# Patient Record
Sex: FEMALE | Race: WHITE
[De-identification: names, ages, dates, MRNs, and addresses within clinical notes are randomized per-mention and may not be internally consistent; named-entity substitution may affect disease eponyms.]

---

## 2018-09-06 ENCOUNTER — HOSPITAL ENCOUNTER (OUTPATIENT)
Dept: HOSPITAL 75 - PREOP | Age: 48
Discharge: HOME | End: 2018-09-06
Attending: PODIATRIST
Payer: COMMERCIAL

## 2018-09-06 VITALS — BODY MASS INDEX: 23.22 KG/M2 | WEIGHT: 136 LBS | HEIGHT: 64 IN

## 2018-09-06 VITALS — SYSTOLIC BLOOD PRESSURE: 126 MMHG | DIASTOLIC BLOOD PRESSURE: 79 MMHG

## 2018-09-06 DIAGNOSIS — Z01.818: Primary | ICD-10-CM

## 2018-09-06 PROCEDURE — 87081 CULTURE SCREEN ONLY: CPT

## 2018-09-24 ENCOUNTER — HOSPITAL ENCOUNTER (OUTPATIENT)
Dept: HOSPITAL 75 - SDC | Age: 48
Discharge: HOME | End: 2018-09-24
Attending: PODIATRIST
Payer: COMMERCIAL

## 2018-09-24 VITALS — BODY MASS INDEX: 23.22 KG/M2 | WEIGHT: 136 LBS | HEIGHT: 64 IN

## 2018-09-24 VITALS — DIASTOLIC BLOOD PRESSURE: 70 MMHG | SYSTOLIC BLOOD PRESSURE: 103 MMHG

## 2018-09-24 VITALS — DIASTOLIC BLOOD PRESSURE: 69 MMHG | SYSTOLIC BLOOD PRESSURE: 104 MMHG

## 2018-09-24 VITALS — SYSTOLIC BLOOD PRESSURE: 133 MMHG | DIASTOLIC BLOOD PRESSURE: 89 MMHG

## 2018-09-24 VITALS — DIASTOLIC BLOOD PRESSURE: 69 MMHG | SYSTOLIC BLOOD PRESSURE: 95 MMHG

## 2018-09-24 DIAGNOSIS — M24.874: ICD-10-CM

## 2018-09-24 DIAGNOSIS — M77.51: ICD-10-CM

## 2018-09-24 DIAGNOSIS — M20.11: Primary | ICD-10-CM

## 2018-09-24 DIAGNOSIS — M89.371: ICD-10-CM

## 2018-09-24 RX ADMIN — SODIUM CHLORIDE, SODIUM LACTATE, POTASSIUM CHLORIDE, AND CALCIUM CHLORIDE PRN MLS/HR: 600; 310; 30; 20 INJECTION, SOLUTION INTRAVENOUS at 09:30

## 2018-09-24 RX ADMIN — SODIUM CHLORIDE, SODIUM LACTATE, POTASSIUM CHLORIDE, AND CALCIUM CHLORIDE PRN MLS/HR: 600; 310; 30; 20 INJECTION, SOLUTION INTRAVENOUS at 07:40

## 2018-09-24 RX ADMIN — SODIUM CHLORIDE, SODIUM LACTATE, POTASSIUM CHLORIDE, AND CALCIUM CHLORIDE PRN MLS/HR: 600; 310; 30; 20 INJECTION, SOLUTION INTRAVENOUS at 06:35

## 2018-09-24 NOTE — PHYSICAL THERAPY ORTHO EVAL
PT Orthopedic Evaluation


Type of Surgery





Hallux Valgus, Capsulitis 2nd MTPJ, right





Prior Level of Function


Current Living Status:  Significant Other


Locomotion     (Upon Admit):  Independent


Pt brought in an iWalk; AD for ambulation; described as "hands free crutch"





Subjective


Subjective


Pt agreeable to PT.  Pt brought in an iWalk AD for ambulation.  She reports she 

did not try to use the device prior to today.  She reports the instructions 

were either in their car or at home.


Entry Into Home:  Stairs With Railing


Steps Into Home:  3





Motor Control


Motor Control:  Motor Control WNL





ROM


ROM:  WFL





Strength


Strength:  WFL


Gross U/LE strength is WNL





Transfer


Pt was able to transfer supine to sit without assist.  Initially she required 

CGA for sit to/from stand transfers but as treatment wore on, she was SBA with 

this transfer.  She performed multiple sit to stand attempts with crutches. 


She also stood and maintained standing with min assist as the iWalk AD was 

applied.





Gait


Gait Assistive Device:  Crutches (iWalk AD)


Right Lower Extremity:  Right


Weight Bearing Status RLE:  Non Weight Bearing


Left Lower Extremity:  Left


Weight Bearing Status LLE:  Full Weight Bearing





Gait training on level surfaces and up/down a curb step x 2 with crutches.


Pt was slightly unsteady initially and needed min assist, as treatment


went on, she was SB to CGA with gait on crutches.  Sig other present and


able to assist as needed.


Gait (FIM):  4 (at end of treatment, CG-SBA)


Distance (FIM):  2=234-80 ft


Summary/Comments


Pt unsteady initially, became more steady as she progressed.  


Up/down a curb step x 2 with CGA using crutches with skilled cues on sequencing 

and safety. 


Applied iWalk AD for ambulation and pt was able to walk a short distance with CG

-min assist with cues for safety.  


Instructed pt and sig other to continue practicing use of iWalk AD with assist 

and to find and read the instruction manual for safer application.





Treatment Rendered


Treatment:  Gait Train, Step Train


Pt able to use crutches for ambulation more safely than iWalk AD.  Also 

informed pt of knee scooter for mobility longer distances.  


Recommend use of crutches until she has had time to practice and use the iWalk 

device in a safer method.





Assessment/Goals


Goal Time Frame:  1 Visit


Recommend supervision with ambulation initially.  Pt had recently taken pain 

meds and was unsteady with upright mobility.  Feel that as this wears off and 

she uses the crutches more, she will be safe with crutch use.





Plan


Treatment Plan:  Discharge


Treatment Duration:  1 visit


PT/Family Agrees to Plan:  Yes





Time


Time In:  1310


Time Out:  1400


Total Billed Treatment Time:  50


Billed Treatment Time


visit


EVL 50











MARLENE SMITH PT Sep 24, 2018 14:30

## 2018-09-24 NOTE — DIAGNOSTIC IMAGING REPORT
INDICATION: Bunion deformity.



FINDINGS: Two fluoroscopic images of the right foot were

obtained. There are postsurgical changes of bunionectomy.

Hardware appears to be in satisfactory position.



IMPRESSION: Intraoperative fluoroscopy during bunionectomy as

described.



Dictated by: 



  Dictated on workstation # [id number]

## 2018-09-24 NOTE — OPERATIVE REPORT
DATE OF SERVICE:  09/24/2018



SURGEON:

Bandar Horn DPM.



PREOPERATIVE DIAGNOSES:

1.  Hallux abductovalgus metatarsal primus right foot.

2.  Hypertrophic second metatarsal, right foot.

3.  Capsulitis with plantar plate tear, right second metatarsophalangeal joint.



POSTOPERATIVE DIAGNOSES:

1.  Hallux abductovalgus metatarsal primus right foot.

2.  Hypertrophic second metatarsal, right foot.

3.  Capsulitis with plantar plate tear, right second metatarsophalangeal joint.



PROCEDURES:

1.  A lapidus bunionectomy with staple fixation and implant.

2.  Akin osteotomy with a 28-gauge monofilament wire fixation.

3.  Second metatarsal osteotomy with screw fixation.

4.  Plantar plate repair utilizing the Arthrex plantar plate repair system 
right 2nd MTPJ.



WOUND CLASS:

Clean.



ANESTHESIA:

General.



HEMOSTASIS:

Pneumatic thigh tourniquet at 250 mmHg.



INDICATIONS:

This 48-year-old female presents complaining of pain to the right forefoot and

bunion area.  Conservative therapy has met with unsatisfactory results and the

patient is agreeable to surgical intervention after risks and complications were

discussed at length.  No guarantees were extended to the patient and she is

willing to proceed.



DESCRIPTION OF PROCEDURE:

The patient was brought back to the operating table and placed in a secure

supine position.  A general anesthetic was then induced.  Appropriate timeout

was performed.  A pneumatic thigh tourniquet was placed on the right lower

extremity over several layers of padding.  The right foot was then prepped and

draped in the normal sterile manner. The right foot was then elevated and

allowed to exsanguinate after which the tourniquet was inflated to 250 mmHg.



Attention was then directed to the medial aspect of the right first metatarsal

cuneiform joint where a 4 cm longitudinal linear incision was created with a

great care to identify and retract all vital neurovascular structures as the

incision was deepened.  A capsulotomy was performed exposing the first

metatarsal cuneiform joint after which a power sagittal saw was utilized to

remove the articular cartilage.  Once the articular cartilage was removed, the

joint ends were then fenestrated utilizing a 0.062 smooth K-wire.  Next, a

calcium phosphate wedge from [employer] was introduced into the arthrodesis site. 

The medial portion of the wedge was 10 mm in width.  Intraoperative C-arm

confirmed appropriate reduction of the first intermetatarsal angle.  Two staples

were then utilized to secure the arthrodesis site; the first was medial and was

20 mm in length.  Excellent bony apposition and compression was appreciated. 

The next was a staple of 18 mm of width to the dorsal aspect of the first

metatarsal cuneiform joint.  The wound was flushed with copious amounts of

normal saline.  Next, [employer] bone matrix was then introduced to pack

in the remaining area of the first metatarsal cuneiform arthrodesis site. 

Closure was then performed in layers.  Deep closure was performed with 3-0

Vicryl, superficial with 4-0 Vicryl, skin closure with 4-0 Prolene in a

horizontal mattress type stitch.



Attention was then directed to the dorsal aspect of the first

metatarsophalangeal joint where a 4 cm longitudinal linear incision was created.

 The incisions were deepened in same plane with care to identify and retract all

vital neurovascular structures.  A longitudinal capsulotomy was performed

overlying the first metatarsophalangeal joint.  A medial eminence to the distal

diaphysis was identified and reduced with the power sagittal saw.  It was

evident that there is a large amount of medial first metatarsal head resected

previously from a bunionectomy performed by another physician.  The wound was

flushed with copious amounts of normal saline.  There was continued to be some

lateral deviation to the right hallux.  A lateral release was then performed. 

The conjoint tendon of the adductor hallucis was released as well as a left

lateral capsulorrhaphy.  It was then decided that an Akin type procedure should

be performed.  Subperiosteal dissection was carried out to the proximal phalanx

diaphysis after which a wedge of bone was resected with the base medial and the

lateral cortices held intact.  Next, a wire passing drill was utilized to create

a hole to the dorsal medial aspect of the osteotomy.  Once the osteotomy was

closed, a 28-gauge monofilament wire was then passed through the  hole

securing the osteotomy in a closed position.  Excellent bony apposition and

fixation was appreciated at this time.  The wound was flushed with copious

amounts of normal saline after which closure was performed in layers.  Deep

closure was performed with 3-0 Vicryl, superficial with 4-0 Vicryl, skin closure

with 4-0 Prolene in a horizontal mattress type stitch.



Attention was then directed to the dorsal aspect of the second

metatarsophalangeal joint where a 5 cm longitudinal linear incision was created.

 The incision was deepened in the same plane with great care to identify and

retract all vital neurovascular structures superiorly and the necessary blood

vessels were cauterized as encountered.  The extensor tendon was identified and

a lengthening performed in a Z slide fashion.  The extensor tendon was reflected

proximally and the extensor holliday released.  Next, a dorsal capsulorrhaphy was

performed to the second metatarsophalangeal joint and a release of the medial

and lateral collateral ligaments.  Next, a Weil type osteotomy was performed

with a power sagittal saw.  The capital fragment was translocated proximally and

fixated in its new position with a 0.062 K wire.  A second K wire was then

introduced to the base of the proximal phalanx.  A McGlamry elevator was

utilized to release any plantar adhesions to the second metatarsal head.  Next,

inspection of the plantar plate indicated a partial tear to the medial distal

portion of the plantar plate.  The plantar proximal aspect of the 2nd digit

proximal phalanx was roughened with a hand rasp.  Next, a [place] blade was

utilized to release the remaining attachment of the plantar plate to the

proximal phalanx.  Utilizing the Arthrex plantar plate repair kit, two

FiberWires were then passed through the distal remaining plantar plate.  Two

holes were created in a cross type fashion to the proximal phalanx from medial

dorsal to plantar lateral and from dorsal lateral to plantar medial.  The

FiberWire was then passed through these  holes at the base of the proximal

phalanx allowing the tension to be appropriate for the plantar plate.  An

excellent alignment of the right second toe was appreciated at this time.  The

second metatarsal head was placed in the appropriate alignment and a 2-0

snap-off screw of 12 mm length was then placed across the second metatarsal

osteotomy with excellent bony apposition and fixation.  The head was further

contoured smooth with a rongeur and power bur.  The wound was flushed with

copious amounts of normal saline.  Once again, the appropriate physiological

tension to the plantar plate was applied and the FiberWire was then hand tied. 

Closure was then performed in layers.  Deep closure was performed with 3-0

Vicryl, superficial with 4-0 Vicryl and skin closed with 4-0 Prolene in a

horizontal mattress type stitch.



Postoperative injection consisted of 10 mL of 0.5% Marcaine plain in a local

infusion to the surgical site.  Preoperative injection was the same with 10 mL

of 0.5% Marcaine in a Javed block and as well as a block to the second ray, right

foot.  Postoperative injection also included 10 mg of dexamethasone to the first

metatarsophalangeal joint area.  Postoperative dressing consisted of Betadine

soaked Adaptic, sterile 4 x 4s, sterile Kerlix all secured with a Coban wrap.



The patient tolerated the anesthesia and procedure well and was transported from

the operating room to the recovery area with vital signs stable and vascular

status intact to all digits of the right foot.  She is to be absolutely

nonweightbearing on the right foot and follow up in my office in 10 days' period

of time.  She was given a prescription for Keflex as well as Vicodin.





Job ID: [id number]

DocumentID: [id number]

Dictated Date:  09/24/2018 11:32:04

Transcription Date: 09/24/2018 13:35:41

Dictated By: DIAZ ANGELA

## 2018-09-24 NOTE — XMS REPORT
Continuity of Care Document

 Created on: 2018



TA FUNES

External Reference #: [id number]

: 1970

Sex: Female



Demographics







 [address]  [address] [place] [place]  [address]

 

 Home Phone  [phone] x

 

 Preferred Language  Unknown

 

 Marital Status  Unknown

 

 Mosque Affiliation  Unknown

 

 Race  Unknown

 

 Ethnic Group  Unknown





Author







 Author  [facility]

 

 Organization  [facility]

 

 Address  Unknown

 

 Phone  Unavailable



              



Allergies

      





 Active            Description            Code            Type            
Severity            Reaction            Onset            Reported/Identified   
         Relationship to Patient            Clinical Status        

 

 Yes            Sulfa (Sulfonamide Antibiotics)            [id number]          
  Drug Allergy            Unknown            N/A                         2014                                  



                  



Medications

      



There is no data.                  



Problems

      





 Date Dx Coded            Attending            Type            Code            
Diagnosis            Diagnosed By        

 

 2014            TONYA CARMONA            Ot            490        
    BRONCHITIS NOS                     

 

 2014            TONYA CARMONA            Ot            698.9      
      PRURITIC DISORDER NOS                     

 

 2014            TONYA CARMONA            Ot            786.2      
      COUGH                     

 

 2014            TONYA CARMONA            Ot            787.02     
       NAUSEA ALONE                     

 

 2014            TONYA CARMONA            Ot            E930.3     
       ADV EFF ERYTHROMYCIN                     

 

 2018            JOHN DPM, HOLLAND Q            Ot            Z01.818     
       ENCOUNTER FOR OTHER PREPROCEDURAL EXAMIN                     

 

 2018            JOHN DPM, HOLLAND Q            Ot            Z01.818     
       ENCOUNTER FOR OTHER PREPROCEDURAL EXAMIN                     



                              



Procedures

      



There is no data.                  



Results

      





 Test            Result            Range        









 Methicillin resistant Staphylococcus aureus (MRSA) screening culture -  10:31         









 MRSA SCREEN RESULT            MRSA NOT ISOLATED             NRG        



                



Encounters

      





 ACCT No.            Visit Date/Time            Discharge            Status    
        Pt. Type            Provider            Facility            Loc./Unit  
          Complaint        

 

 [id number]            09/10/2018 08:00:00            09/10/2018 23:59:59    
        [facility]            [facility]            JOHN DPM, HOLLAND Q            [facility]            SDC            [facility]        

 

 [id number]            2018 10:15:00            2018 10:35:00    
        DIS            Outpatient            JOHN DPM, HOLLAND Q            [facility]            PREOP            HALLUX VALGUS        

 

 [id number]            2014 11:21:00            2014 13:19:00    
        DIS            Emergency            TONYA CARMONA            [facility]            ER            COUGH/CONGESTION        

 

 [email or web address]            2014 11:21:54                         [facility]            
Document Registration

## 2018-09-24 NOTE — ANESTHESIA-GENERAL POST-OP
General


Patient Condition


Mental Status/LOC:  Same as Preop


Cardiovascular:  Satisfactory


Nausea/Vomiting:  Absent


Respiratory:  Satisfactory


Pain:  Controlled


Complications:  Absent





Post Op Complications


Complications


None





Follow Up Care/Instructions


Patient Instructions


None needed.





Anesthesia/Patient Condition


Patient Condition


Patient is doing well, no complaints, stable vital signs, no apparent adverse 

anesthesia problems.   


No complications reported per nursing.











GONZÁLEZ BONNER CRNA Sep 24, 2018 14:03

## 2018-09-24 NOTE — PROGRESS NOTE-PRE OPERATIVE
Pre-Operative Progress Note


H&P Reviewed


The H&P was reviewed, patient examined and no changes noted.


Date Seen by Provider:  Sep 24, 2018


Time Seen by Provider:  07:12


Date H&P Reviewed:  Sep 24, 2018


Time H&P Reviewed:  07:12


Pre-Operative Diagnosis:  Hallux Valgus, Capsulitis 2nd MTPJ, right











HOLLAND LOPEZ DPM Sep 24, 2018 7:13 am

## 2019-02-02 NOTE — PROGRESS NOTE-POST OPERATIVE
Post-Operative Progess Note


Surgeon (s)/Assistant (s)


Surgeon


HOLLAND LOPEZ DPM


Assistant:  none





Pre-Operative Diagnosis


Hallux Valgus, Capsulitis 2nd MTPJ, right





Post-Operative Diagnosis





same, plus hypertrophic 2nd metatarsal head, right





Procedure & Operative Findings


Date of Procedure


9/24/18


Procedure Performed/Findings


Lapidus bunionectomy, Akin osteotomy, 2nd metatarsal osteotomy, plantar plate 

repair 2nd MTPJ, all right


Anesthesia Type


general





Estimated Blood Loss


Estimated blood loss (mL):  Minimal





Specimens/Packing


Specimens Removed


none











HOLLAND LOPEZ DPM Sep 24, 2018 11:15 am 02-Feb-2019 16:17